# Patient Record
(demographics unavailable — no encounter records)

---

## 2024-10-30 NOTE — ASSESSMENT
[FreeTextEntry1] : Allergic rhinitis.  Nasal polyp in the right nostril.  Trial of mometasone nasal spray, 2 sprays per nostril twice daily.  Reduce to 1 spray per nostril twice daily if excessive dryness and/or bloody mucus.  Blood work for allergies.  Skin testing was not performed because patient has elevated blood pressure reading.  If systemic reaction to skin testing, needs epinephrine, which could elevate blood pressure further to dangerous levels.   Elevated blood pressure reading: Monitor blood pressure at home, reduce sodium intake, find primary care physician and get physical exam.

## 2024-10-30 NOTE — REVIEW OF SYSTEMS
[Nasal Congestion] : nasal congestion [Reduced sense of smell] : no reduced sense of smell [Difficulty Breathing] : no dyspnea [Recurrent Sinus Infections] : no recurrent sinus infections [Recurrent Bronchitis] : no recurrent bronchitis [FreeTextEntry7] : rare heartburn

## 2024-10-30 NOTE — SOCIAL HISTORY
[de-identified] : House with oil baseboard heating, central air conditioning, air , carpet in the bedroom, no pets (had cats before), no cigarette smoke exposure.  Smoked 1/2 to 3/4 pack/day for 15 to 18 years but quit 10 years ago.

## 2024-10-30 NOTE — PHYSICAL EXAM
[Alert] : alert [No Acute Distress] : no acute distress [Normal Voice/Communication] : normal voice communication [Supple] : the neck was supple [Normal S1, S2] : normal S1 and S2 [Regular Rhythm] : with a regular rhythm [Soft] : abdomen soft [Not Tender] : non-tender [Not Distended] : not distended [No HSM] : no hepato-splenomegaly [Normal Cervical Lymph Nodes] : cervical [Skin Intact] : skin intact  [No clubbing] : no clubbing [No Cyanosis] : no cyanosis [Alert, Awake, Oriented as Age-Appropriate] : alert, awake, oriented as age appropriate [de-identified] : Eyes clear. [de-identified] : Throat minimal erythema posterior wall.  Nasal mucosa pink, moderate stuffiness on the left, nasal polyp on the right, mild stuffiness on the right, no discharge.  Tympanic membranes normal.  No sinus tenderness. [de-identified] : Chest clear, good air entry, no wheezing or crackles. [de-identified] : Tattoos on arms and upper back.

## 2024-10-30 NOTE — REASON FOR VISIT
[Evaluation/Consultation] : an evaluation/consultation of [Allergic Rhinitis] : allergic rhinitis [FreeTextEntry3] : nasal polyp

## 2024-10-30 NOTE — HISTORY OF PRESENT ILLNESS
[de-identified] : In office to be evaluated for possible allergies and nasal polyp.  Symptoms for at least 2 years, consisting of stuffy nose all year-round with no season change.  Has preserved sense of smell.  Tried Flonase briefly with questionable benefit.  Does not require frequent antibiotics respiratory infections.  CT scan performed within the last 2 years detected nasal polyp on the right side.  He did not see the ENT yet.  No history of asthma or chest symptoms. Medication reaction: His mother told him that he had allergic reaction to penicillin as an infant, possibly with high fever. He does not take any medications daily.